# Patient Record
Sex: MALE | Race: WHITE | NOT HISPANIC OR LATINO | URBAN - METROPOLITAN AREA
[De-identification: names, ages, dates, MRNs, and addresses within clinical notes are randomized per-mention and may not be internally consistent; named-entity substitution may affect disease eponyms.]

---

## 2017-01-21 ENCOUNTER — OUTPATIENT (OUTPATIENT)
Dept: OUTPATIENT SERVICES | Facility: HOSPITAL | Age: 62
LOS: 1 days | End: 2017-01-21
Payer: MEDICAID

## 2017-01-21 PROCEDURE — 76536 US EXAM OF HEAD AND NECK: CPT | Mod: 26

## 2017-02-23 ENCOUNTER — OUTPATIENT (OUTPATIENT)
Dept: OUTPATIENT SERVICES | Facility: HOSPITAL | Age: 62
LOS: 1 days | End: 2017-02-23
Payer: MEDICAID

## 2017-02-24 ENCOUNTER — OUTPATIENT (OUTPATIENT)
Dept: OUTPATIENT SERVICES | Facility: HOSPITAL | Age: 62
LOS: 1 days | End: 2017-02-24

## 2017-02-24 PROCEDURE — 78014 THYROID IMAGING W/BLOOD FLOW: CPT | Mod: 26

## 2020-09-28 ENCOUNTER — APPOINTMENT (OUTPATIENT)
Dept: DISASTER EMERGENCY | Facility: CLINIC | Age: 65
End: 2020-09-28

## 2020-09-29 LAB — SARS-COV-2 N GENE NPH QL NAA+PROBE: NOT DETECTED

## 2020-10-01 ENCOUNTER — OUTPATIENT (OUTPATIENT)
Dept: INPATIENT UNIT | Facility: HOSPITAL | Age: 65
LOS: 1 days | End: 2020-10-01
Payer: MEDICARE

## 2020-10-01 PROCEDURE — 93010 ELECTROCARDIOGRAM REPORT: CPT

## 2021-03-31 ENCOUNTER — RESULT REVIEW (OUTPATIENT)
Age: 66
End: 2021-03-31

## 2021-08-02 ENCOUNTER — TRANSCRIPTION ENCOUNTER (OUTPATIENT)
Age: 66
End: 2021-08-02

## 2021-08-09 ENCOUNTER — LABORATORY RESULT (OUTPATIENT)
Age: 66
End: 2021-08-09

## 2021-08-09 ENCOUNTER — APPOINTMENT (OUTPATIENT)
Dept: FAMILY MEDICINE | Facility: CLINIC | Age: 66
End: 2021-08-09
Payer: MEDICARE

## 2021-08-09 VITALS
HEART RATE: 50 BPM | SYSTOLIC BLOOD PRESSURE: 138 MMHG | WEIGHT: 203.19 LBS | TEMPERATURE: 97.5 F | BODY MASS INDEX: 30.8 KG/M2 | OXYGEN SATURATION: 98 % | HEIGHT: 68 IN | RESPIRATION RATE: 16 BRPM | DIASTOLIC BLOOD PRESSURE: 64 MMHG

## 2021-08-09 DIAGNOSIS — Z80.3 FAMILY HISTORY OF MALIGNANT NEOPLASM OF BREAST: ICD-10-CM

## 2021-08-09 DIAGNOSIS — Z23 ENCOUNTER FOR IMMUNIZATION: ICD-10-CM

## 2021-08-09 DIAGNOSIS — Z87.891 PERSONAL HISTORY OF NICOTINE DEPENDENCE: ICD-10-CM

## 2021-08-09 DIAGNOSIS — Z00.00 ENCOUNTER FOR GENERAL ADULT MEDICAL EXAMINATION W/OUT ABNORMAL FINDINGS: ICD-10-CM

## 2021-08-09 DIAGNOSIS — Z84.1 FAMILY HISTORY OF DISORDERS OF KIDNEY AND URETER: ICD-10-CM

## 2021-08-09 DIAGNOSIS — Z82.49 FAMILY HISTORY OF ISCHEMIC HEART DISEASE AND OTHER DISEASES OF THE CIRCULATORY SYSTEM: ICD-10-CM

## 2021-08-09 DIAGNOSIS — Z78.9 OTHER SPECIFIED HEALTH STATUS: ICD-10-CM

## 2021-08-09 DIAGNOSIS — Z87.898 PERSONAL HISTORY OF OTHER SPECIFIED CONDITIONS: ICD-10-CM

## 2021-08-09 PROCEDURE — 99203 OFFICE O/P NEW LOW 30 MIN: CPT | Mod: 25

## 2021-08-09 PROCEDURE — 36415 COLL VENOUS BLD VENIPUNCTURE: CPT

## 2021-08-09 RX ORDER — METOPROLOL SUCCINATE 100 MG/1
100 TABLET, EXTENDED RELEASE ORAL
Qty: 180 | Refills: 0 | Status: ACTIVE | COMMUNITY
Start: 2021-04-21

## 2021-08-09 RX ORDER — TAMSULOSIN HYDROCHLORIDE 0.4 MG/1
0.4 CAPSULE ORAL
Qty: 30 | Refills: 0 | Status: ACTIVE | COMMUNITY
Start: 2021-06-30

## 2021-08-09 NOTE — PLAN
[FreeTextEntry1] : Blood work obtained.\antonino Will call with results of blood work.\antonino AVELAR will f/u with [facility]

## 2021-08-09 NOTE — HISTORY OF PRESENT ILLNESS
[FreeTextEntry8] : VALENTINO would like to establish care and have routine blood work done that he was getting done every 3 months with his previous primary care doctor \par [facility]\par allergies: PCN\par \par VALENTINO states he feels otherwise in good health.

## 2021-08-09 NOTE — HEALTH RISK ASSESSMENT
[Yes] : Yes [2 - 4 times a month (2 pts)] : 2-4 times a month (2 points) [1 or 2 (0 pts)] : 1 or 2 (0 points) [No] : In the past 12 months have you used drugs other than those required for medical reasons? No [Any fall with injury in past year] : Patient reported fall with injury in the past year [0] : 2) Feeling down, depressed, or hopeless: Not at all (0) [] : No [YearQuit] : 1996

## 2021-08-17 LAB
ALBUMIN SERPL ELPH-MCNC: 4.4 G/DL
ALP BLD-CCNC: 80 U/L
ALT SERPL-CCNC: 18 U/L
ANION GAP SERPL CALC-SCNC: 10 MMOL/L
AST SERPL-CCNC: 19 U/L
BASOPHILS # BLD AUTO: 0.04 K/UL
BASOPHILS NFR BLD AUTO: 0.5 %
BILIRUB SERPL-MCNC: 0.6 MG/DL
BUN SERPL-MCNC: 16 MG/DL
CALCIUM SERPL-MCNC: 10.1 MG/DL
CHLORIDE SERPL-SCNC: 102 MMOL/L
CHOLEST SERPL-MCNC: 104 MG/DL
CO2 SERPL-SCNC: 31 MMOL/L
CREAT SERPL-MCNC: 1.11 MG/DL
EOSINOPHIL # BLD AUTO: 0.17 K/UL
EOSINOPHIL NFR BLD AUTO: 2 %
ESTIMATED AVERAGE GLUCOSE: 123 MG/DL
ESTIMATED AVERAGE GLUCOSE: 126 MG/DL
GLUCOSE SERPL-MCNC: 117 MG/DL
HBA1C MFR BLD HPLC: 5.9 %
HBA1C MFR BLD HPLC: 6 %
HCT VFR BLD CALC: 44.5 %
HDLC SERPL-MCNC: 34 MG/DL
HGB BLD-MCNC: 14.3 G/DL
IMM GRANULOCYTES NFR BLD AUTO: 0.4 %
LDLC SERPL CALC-MCNC: 51 MG/DL
LYMPHOCYTES # BLD AUTO: 1.38 K/UL
LYMPHOCYTES NFR BLD AUTO: 16.4 %
MAN DIFF?: NORMAL
MCHC RBC-ENTMCNC: 29.9 PG
MCHC RBC-ENTMCNC: 32.1 GM/DL
MCV RBC AUTO: 93.1 FL
MONOCYTES # BLD AUTO: 0.52 K/UL
MONOCYTES NFR BLD AUTO: 6.2 %
NEUTROPHILS # BLD AUTO: 6.29 K/UL
NEUTROPHILS NFR BLD AUTO: 74.5 %
NONHDLC SERPL-MCNC: 70 MG/DL
PLATELET # BLD AUTO: 245 K/UL
POTASSIUM SERPL-SCNC: 5.1 MMOL/L
PROT SERPL-MCNC: 6.4 G/DL
RBC # BLD: 4.78 M/UL
RBC # FLD: 14 %
SODIUM SERPL-SCNC: 142 MMOL/L
TRIGL SERPL-MCNC: 98 MG/DL
TSH SERPL-ACNC: 7.9 UIU/ML
WBC # FLD AUTO: 8.43 K/UL

## 2021-09-23 ENCOUNTER — RX RENEWAL (OUTPATIENT)
Age: 66
End: 2021-09-23

## 2021-09-23 RX ORDER — BLOOD SUGAR DIAGNOSTIC
STRIP MISCELLANEOUS
Qty: 100 | Refills: 0 | Status: ACTIVE | COMMUNITY
Start: 2021-01-21 | End: 1900-01-01

## 2021-10-28 ENCOUNTER — RX RENEWAL (OUTPATIENT)
Age: 66
End: 2021-10-28

## 2021-10-28 RX ORDER — LANCETS 30 GAUGE
EACH MISCELLANEOUS
Qty: 1 | Refills: 2 | Status: ACTIVE | COMMUNITY
Start: 2021-10-28 | End: 1900-01-01

## 2021-11-01 ENCOUNTER — RX RENEWAL (OUTPATIENT)
Age: 66
End: 2021-11-01

## 2021-11-01 RX ORDER — LANCETS 28 GAUGE
EACH MISCELLANEOUS
Qty: 300 | Refills: 0 | Status: ACTIVE | COMMUNITY
Start: 2021-01-21 | End: 1900-01-01

## 2021-11-15 ENCOUNTER — RX RENEWAL (OUTPATIENT)
Age: 66
End: 2021-11-15

## 2021-12-16 ENCOUNTER — RX RENEWAL (OUTPATIENT)
Age: 66
End: 2021-12-16

## 2022-02-25 ENCOUNTER — LABORATORY RESULT (OUTPATIENT)
Age: 67
End: 2022-02-25

## 2022-02-25 ENCOUNTER — APPOINTMENT (OUTPATIENT)
Dept: FAMILY MEDICINE | Facility: CLINIC | Age: 67
End: 2022-02-25
Payer: MEDICARE

## 2022-02-25 VITALS
DIASTOLIC BLOOD PRESSURE: 62 MMHG | BODY MASS INDEX: 34.71 KG/M2 | WEIGHT: 229 LBS | TEMPERATURE: 98 F | HEART RATE: 49 BPM | SYSTOLIC BLOOD PRESSURE: 156 MMHG | OXYGEN SATURATION: 97 % | RESPIRATION RATE: 16 BRPM | HEIGHT: 68 IN

## 2022-02-25 PROCEDURE — G0009: CPT

## 2022-02-25 PROCEDURE — G0439: CPT

## 2022-02-25 PROCEDURE — 36415 COLL VENOUS BLD VENIPUNCTURE: CPT

## 2022-02-25 PROCEDURE — 90732 PPSV23 VACC 2 YRS+ SUBQ/IM: CPT

## 2022-02-25 RX ORDER — COLD-HOT PACK
125 MCG EACH MISCELLANEOUS
Refills: 0 | Status: ACTIVE | COMMUNITY
Start: 2022-02-25

## 2022-02-25 RX ORDER — ASCORBIC ACID 500 MG
500 TABLET ORAL
Refills: 0 | Status: ACTIVE | COMMUNITY
Start: 2022-02-25

## 2022-03-01 LAB
ALBUMIN SERPL ELPH-MCNC: 4.3 G/DL
ALP BLD-CCNC: 67 U/L
ALT SERPL-CCNC: 19 U/L
ANION GAP SERPL CALC-SCNC: 11 MMOL/L
AST SERPL-CCNC: 16 U/L
BASOPHILS # BLD AUTO: 0.04 K/UL
BASOPHILS NFR BLD AUTO: 0.5 %
BILIRUB SERPL-MCNC: 0.7 MG/DL
BUN SERPL-MCNC: 17 MG/DL
CALCIUM SERPL-MCNC: 9.3 MG/DL
CHLORIDE SERPL-SCNC: 100 MMOL/L
CHOLEST SERPL-MCNC: 113 MG/DL
CO2 SERPL-SCNC: 30 MMOL/L
CREAT SERPL-MCNC: 1.06 MG/DL
EOSINOPHIL # BLD AUTO: 0.23 K/UL
EOSINOPHIL NFR BLD AUTO: 2.9 %
ESTIMATED AVERAGE GLUCOSE: 126 MG/DL
GLUCOSE SERPL-MCNC: 124 MG/DL
HBA1C MFR BLD HPLC: 6 %
HCT VFR BLD CALC: 47.6 %
HDLC SERPL-MCNC: 34 MG/DL
HGB BLD-MCNC: 14.9 G/DL
IMM GRANULOCYTES NFR BLD AUTO: 0.3 %
LDLC SERPL CALC-MCNC: 55 MG/DL
LYMPHOCYTES # BLD AUTO: 1.26 K/UL
LYMPHOCYTES NFR BLD AUTO: 16.2 %
MAN DIFF?: NORMAL
MCHC RBC-ENTMCNC: 29.4 PG
MCHC RBC-ENTMCNC: 31.3 GM/DL
MCV RBC AUTO: 93.9 FL
MONOCYTES # BLD AUTO: 0.45 K/UL
MONOCYTES NFR BLD AUTO: 5.8 %
NEUTROPHILS # BLD AUTO: 5.8 K/UL
NEUTROPHILS NFR BLD AUTO: 74.3 %
NONHDLC SERPL-MCNC: 79 MG/DL
PLATELET # BLD AUTO: 192 K/UL
POTASSIUM SERPL-SCNC: 4.4 MMOL/L
PROT SERPL-MCNC: 6.4 G/DL
PSA FREE FLD-MCNC: 26 %
PSA FREE SERPL-MCNC: 1.72 NG/ML
PSA SERPL-MCNC: 6.72 NG/ML
RBC # BLD: 5.07 M/UL
RBC # FLD: 14.6 %
SODIUM SERPL-SCNC: 141 MMOL/L
TRIGL SERPL-MCNC: 123 MG/DL
TSH SERPL-ACNC: 8.13 UIU/ML
WBC # FLD AUTO: 7.8 K/UL

## 2022-03-01 NOTE — HISTORY OF PRESENT ILLNESS
[FreeTextEntry1] : FBW\par cpe\par "gets short of breath" on/off gained 20lbs\par VALENTINO did not take medications this am.  [de-identified] : VALENTINO sees cardio , GI and Urology.\par Colonoscopy [place] [facility] polop March 31, 2021\par Prostate - bx July 28, 2021\par VALENTINO f/u with optho and podo.

## 2022-03-01 NOTE — ADDENDUM
[FreeTextEntry1] : Reviewed blood work \par a1c 6.0\par Chol 117\par PSA 6.7- VALENTINO aware and will f/u with urology\par Sick euthyroid

## 2022-03-01 NOTE — PHYSICAL EXAM
[No Acute Distress] : no acute distress [Well Nourished] : well nourished [Well Developed] : well developed [Well-Appearing] : well-appearing [Normal Sclera/Conjunctiva] : normal sclera/conjunctiva [PERRL] : pupils equal round and reactive to light [EOMI] : extraocular movements intact [Normal Outer Ear/Nose] : the outer ears and nose were normal in appearance [No JVD] : no jugular venous distention [No Lymphadenopathy] : no lymphadenopathy [Supple] : supple [No Respiratory Distress] : no respiratory distress  [No Accessory Muscle Use] : no accessory muscle use [Clear to Auscultation] : lungs were clear to auscultation bilaterally [Normal Rate] : normal rate  [Regular Rhythm] : with a regular rhythm [Normal S1, S2] : normal S1 and S2 [No Murmur] : no murmur heard [Pedal Pulses Present] : the pedal pulses are present [Soft] : abdomen soft [Non Tender] : non-tender [Non-distended] : non-distended [Normal Posterior Cervical Nodes] : no posterior cervical lymphadenopathy [Normal Anterior Cervical Nodes] : no anterior cervical lymphadenopathy [No CVA Tenderness] : no CVA  tenderness [No Spinal Tenderness] : no spinal tenderness [No Joint Swelling] : no joint swelling [Grossly Normal Strength/Tone] : grossly normal strength/tone [No Rash] : no rash [Coordination Grossly Intact] : coordination grossly intact [No Focal Deficits] : no focal deficits [Normal Gait] : normal gait [Deep Tendon Reflexes (DTR)] : deep tendon reflexes were 2+ and symmetric [Normal Affect] : the affect was normal [Normal Insight/Judgement] : insight and judgment were intact [de-identified] : +2 edema b/l

## 2022-03-01 NOTE — PLAN
[FreeTextEntry1] : Blood work obtained.\par Will call with results of blood work.\par Pneumo 23 given\par Increased furosemide to 40 mg

## 2022-03-17 ENCOUNTER — RX RENEWAL (OUTPATIENT)
Age: 67
End: 2022-03-17

## 2022-05-10 ENCOUNTER — APPOINTMENT (OUTPATIENT)
Dept: FAMILY MEDICINE | Facility: CLINIC | Age: 67
End: 2022-05-10
Payer: MEDICARE

## 2022-05-10 ENCOUNTER — NON-APPOINTMENT (OUTPATIENT)
Age: 67
End: 2022-05-10

## 2022-05-10 VITALS
SYSTOLIC BLOOD PRESSURE: 130 MMHG | HEIGHT: 68 IN | BODY MASS INDEX: 35.31 KG/M2 | DIASTOLIC BLOOD PRESSURE: 80 MMHG | RESPIRATION RATE: 16 BRPM | HEART RATE: 67 BPM | WEIGHT: 233 LBS | TEMPERATURE: 98.2 F | OXYGEN SATURATION: 99 %

## 2022-05-10 VITALS — DIASTOLIC BLOOD PRESSURE: 86 MMHG | SYSTOLIC BLOOD PRESSURE: 140 MMHG

## 2022-05-10 DIAGNOSIS — Z86.79 PERSONAL HISTORY OF OTHER DISEASES OF THE CIRCULATORY SYSTEM: ICD-10-CM

## 2022-05-10 PROCEDURE — 99214 OFFICE O/P EST MOD 30 MIN: CPT | Mod: 25

## 2022-05-10 PROCEDURE — 36415 COLL VENOUS BLD VENIPUNCTURE: CPT

## 2022-05-10 NOTE — PHYSICAL EXAM
[No Edema] : there was no peripheral edema [No Extremity Clubbing/Cyanosis] : no extremity clubbing/cyanosis [Coordination Grossly Intact] : coordination grossly intact [No Focal Deficits] : no focal deficits [Normal Gait] : normal gait [Alert and Oriented x3] : oriented to person, place, and time [Normal] : affect was normal and insight and judgment were intact [de-identified] : truncal obesity

## 2022-05-10 NOTE — PLAN
[FreeTextEntry1] : Blood drawn in office\par 19A form completed\par low fat/ low carb diet recommended. exercise ( walking ) recommended as tolerated 30-40 minutes daily a minimum of 4 times per week.\par \par

## 2022-05-10 NOTE — HISTORY OF PRESENT ILLNESS
[FreeTextEntry1] : BP Check for 19 A  form\antonino Gilbert  [de-identified] : Carter needs BP documented on  19A form

## 2022-05-12 LAB
ALBUMIN SERPL ELPH-MCNC: 4.6 G/DL
ALP BLD-CCNC: 85 U/L
ALT SERPL-CCNC: 20 U/L
ANION GAP SERPL CALC-SCNC: 12 MMOL/L
AST SERPL-CCNC: 19 U/L
BILIRUB DIRECT SERPL-MCNC: 0.2 MG/DL
BILIRUB INDIRECT SERPL-MCNC: 0.4 MG/DL
BILIRUB SERPL-MCNC: 0.6 MG/DL
BUN SERPL-MCNC: 23 MG/DL
CALCIUM SERPL-MCNC: 9.9 MG/DL
CHLORIDE SERPL-SCNC: 97 MMOL/L
CHOLEST SERPL-MCNC: 122 MG/DL
CO2 SERPL-SCNC: 32 MMOL/L
CREAT SERPL-MCNC: 1.24 MG/DL
EGFR: 63 ML/MIN/1.73M2
ESTIMATED AVERAGE GLUCOSE: 131 MG/DL
GLUCOSE SERPL-MCNC: 104 MG/DL
HBA1C MFR BLD HPLC: 6.2 %
HDLC SERPL-MCNC: 38 MG/DL
LDLC SERPL CALC-MCNC: 52 MG/DL
NONHDLC SERPL-MCNC: 83 MG/DL
POTASSIUM SERPL-SCNC: 4.3 MMOL/L
PROT SERPL-MCNC: 6.8 G/DL
SODIUM SERPL-SCNC: 142 MMOL/L
T4 FREE SERPL-MCNC: 1.3 NG/DL
TRIGL SERPL-MCNC: 156 MG/DL
TSH SERPL-ACNC: 6.86 UIU/ML

## 2022-06-30 ENCOUNTER — RX RENEWAL (OUTPATIENT)
Age: 67
End: 2022-06-30

## 2022-07-01 ENCOUNTER — TRANSCRIPTION ENCOUNTER (OUTPATIENT)
Age: 67
End: 2022-07-01

## 2022-07-05 ENCOUNTER — RX RENEWAL (OUTPATIENT)
Age: 67
End: 2022-07-05

## 2022-09-27 ENCOUNTER — RX RENEWAL (OUTPATIENT)
Age: 67
End: 2022-09-27

## 2022-10-04 ENCOUNTER — RX RENEWAL (OUTPATIENT)
Age: 67
End: 2022-10-04

## 2022-11-11 ENCOUNTER — APPOINTMENT (OUTPATIENT)
Dept: FAMILY MEDICINE | Facility: CLINIC | Age: 67
End: 2022-11-11

## 2022-11-11 VITALS
WEIGHT: 218 LBS | TEMPERATURE: 98.2 F | DIASTOLIC BLOOD PRESSURE: 80 MMHG | HEART RATE: 80 BPM | HEIGHT: 68 IN | RESPIRATION RATE: 16 BRPM | OXYGEN SATURATION: 97 % | BODY MASS INDEX: 33.04 KG/M2 | SYSTOLIC BLOOD PRESSURE: 110 MMHG

## 2022-11-11 PROCEDURE — 99214 OFFICE O/P EST MOD 30 MIN: CPT

## 2022-11-11 RX ORDER — AMLODIPINE BESYLATE 5 MG/1
5 TABLET ORAL DAILY
Qty: 90 | Refills: 0 | Status: DISCONTINUED | COMMUNITY
Start: 2021-02-22 | End: 2022-11-11

## 2022-11-11 RX ORDER — TORSEMIDE 20 MG/1
20 TABLET ORAL
Qty: 30 | Refills: 0 | Status: ACTIVE | COMMUNITY
Start: 2022-08-16

## 2022-11-11 RX ORDER — ZOSTER VACCINE RECOMBINANT, ADJUVANTED 50 MCG/0.5
50 KIT INTRAMUSCULAR
Qty: 1 | Refills: 0 | Status: DISCONTINUED | COMMUNITY
Start: 2022-02-25 | End: 2022-11-11

## 2022-11-11 RX ORDER — APIXABAN 5 MG/1
5 TABLET, FILM COATED ORAL
Qty: 180 | Refills: 0 | Status: ACTIVE | COMMUNITY
Start: 2022-05-24

## 2022-11-11 RX ORDER — DUTASTERIDE 0.5 MG/1
0.5 CAPSULE, LIQUID FILLED ORAL
Qty: 90 | Refills: 0 | Status: ACTIVE | COMMUNITY
Start: 2022-07-12

## 2022-11-11 RX ORDER — MUPIROCIN 20 MG/G
2 OINTMENT TOPICAL
Qty: 22 | Refills: 0 | Status: DISCONTINUED | COMMUNITY
Start: 2022-08-23 | End: 2022-11-11

## 2022-11-11 NOTE — PHYSICAL EXAM
[No Acute Distress] : no acute distress [Well Nourished] : well nourished [Well Developed] : well developed [Well-Appearing] : well-appearing [Normal Sclera/Conjunctiva] : normal sclera/conjunctiva [PERRL] : pupils equal round and reactive to light [EOMI] : extraocular movements intact [Normal Outer Ear/Nose] : the outer ears and nose were normal in appearance [No JVD] : no jugular venous distention [No Lymphadenopathy] : no lymphadenopathy [Supple] : supple [No Respiratory Distress] : no respiratory distress  [No Accessory Muscle Use] : no accessory muscle use [Clear to Auscultation] : lungs were clear to auscultation bilaterally [Normal Rate] : normal rate  [Regular Rhythm] : with a regular rhythm [Normal S1, S2] : normal S1 and S2 [No Murmur] : no murmur heard [Pedal Pulses Present] : the pedal pulses are present [Soft] : abdomen soft [Non Tender] : non-tender [Non-distended] : non-distended [Normal Posterior Cervical Nodes] : no posterior cervical lymphadenopathy [Normal Anterior Cervical Nodes] : no anterior cervical lymphadenopathy [No CVA Tenderness] : no CVA  tenderness [No Spinal Tenderness] : no spinal tenderness [No Joint Swelling] : no joint swelling [Grossly Normal Strength/Tone] : grossly normal strength/tone [No Rash] : no rash [Coordination Grossly Intact] : coordination grossly intact [No Focal Deficits] : no focal deficits [Normal Gait] : normal gait [Deep Tendon Reflexes (DTR)] : deep tendon reflexes were 2+ and symmetric [Normal Affect] : the affect was normal [Normal Insight/Judgement] : insight and judgment were intact [de-identified] : +2 edema b/l

## 2022-11-11 NOTE — HISTORY OF PRESENT ILLNESS
[FreeTextEntry1] : paperwork filled out for [employer] \par VALENTINO states he had ablation done 9/28/2022 states A-Fib is back, has upcoming apt with cardio to see what is going on \par would like to discuss being prescribed semaglutide as per surgeon \par losartan refilled \par Kiki [place]  [de-identified] : VALENTINO states he feels otherwise in good health. \par Ablation 9/25/2022

## 2022-11-11 NOTE — HEALTH RISK ASSESSMENT
[Former] : Former [No] : In the past 12 months have you used drugs other than those required for medical reasons? No [Any fall with injury in past year] : Patient reported fall with injury in the past year [0] : 2) Feeling down, depressed, or hopeless: Not at all (0) [PHQ-2 Negative - No further assessment needed] : PHQ-2 Negative - No further assessment needed [Audit-CScore] : 0 [[id number]] : 0

## 2022-12-27 ENCOUNTER — RX RENEWAL (OUTPATIENT)
Age: 67
End: 2022-12-27

## 2022-12-30 ENCOUNTER — RX RENEWAL (OUTPATIENT)
Age: 67
End: 2022-12-30

## 2023-02-21 ENCOUNTER — TRANSCRIPTION ENCOUNTER (OUTPATIENT)
Age: 68
End: 2023-02-21

## 2023-02-21 RX ORDER — LOSARTAN POTASSIUM AND HYDROCHLOROTHIAZIDE 25; 100 MG/1; MG/1
100-25 TABLET ORAL
Qty: 90 | Refills: 0 | Status: ACTIVE | COMMUNITY
Start: 2021-02-22 | End: 1900-01-01

## 2023-03-12 ENCOUNTER — APPOINTMENT (OUTPATIENT)
Dept: FAMILY MEDICINE | Facility: CLINIC | Age: 68
End: 2023-03-12
Payer: MEDICARE

## 2023-03-12 VITALS
TEMPERATURE: 98 F | DIASTOLIC BLOOD PRESSURE: 70 MMHG | RESPIRATION RATE: 16 BRPM | OXYGEN SATURATION: 96 % | WEIGHT: 222.9 LBS | HEART RATE: 86 BPM | BODY MASS INDEX: 33.78 KG/M2 | HEIGHT: 68 IN | SYSTOLIC BLOOD PRESSURE: 110 MMHG

## 2023-03-12 DIAGNOSIS — E03.9 HYPOTHYROIDISM, UNSPECIFIED: ICD-10-CM

## 2023-03-12 DIAGNOSIS — I10 ESSENTIAL (PRIMARY) HYPERTENSION: ICD-10-CM

## 2023-03-12 DIAGNOSIS — E11.9 TYPE 2 DIABETES MELLITUS W/OUT COMPLICATIONS: ICD-10-CM

## 2023-03-12 DIAGNOSIS — I48.0 PAROXYSMAL ATRIAL FIBRILLATION: ICD-10-CM

## 2023-03-12 PROCEDURE — 99214 OFFICE O/P EST MOD 30 MIN: CPT | Mod: 25

## 2023-03-12 PROCEDURE — G0444 DEPRESSION SCREEN ANNUAL: CPT | Mod: 59

## 2023-03-12 RX ORDER — MOLNUPIRAVIR 200 MG/1
200 CAPSULE ORAL
Qty: 40 | Refills: 0 | Status: DISCONTINUED | COMMUNITY
Start: 2023-02-12 | End: 2023-03-12

## 2023-03-12 RX ORDER — TRIAMCINOLONE ACETONIDE 1 MG/G
0.1 OINTMENT TOPICAL
Qty: 80 | Refills: 0 | Status: DISCONTINUED | COMMUNITY
Start: 2022-08-23 | End: 2023-03-12

## 2023-03-12 RX ORDER — HYDROCODONE BITARTRATE AND ACETAMINOPHEN 5; 325 MG/1; MG/1
5-325 TABLET ORAL
Qty: 10 | Refills: 0 | Status: DISCONTINUED | COMMUNITY
Start: 2021-07-08 | End: 2023-03-12

## 2023-03-12 RX ORDER — PANTOPRAZOLE 40 MG/1
40 TABLET, DELAYED RELEASE ORAL
Qty: 30 | Refills: 0 | Status: DISCONTINUED | COMMUNITY
Start: 2022-09-28 | End: 2023-03-12

## 2023-03-12 RX ORDER — CALCIPOTRIENE 50 UG/G
0.01 OINTMENT TOPICAL
Qty: 120 | Refills: 0 | Status: DISCONTINUED | COMMUNITY
Start: 2022-08-23 | End: 2023-03-12

## 2023-03-12 RX ORDER — FUROSEMIDE 40 MG/1
40 TABLET ORAL DAILY
Qty: 90 | Refills: 0 | Status: DISCONTINUED | COMMUNITY
Start: 2021-02-09 | End: 2023-03-12

## 2023-03-12 RX ORDER — TERBINAFINE HYDROCHLORIDE 250 MG/1
250 TABLET ORAL
Refills: 0 | Status: DISCONTINUED | COMMUNITY
Start: 2022-02-25 | End: 2023-03-12

## 2023-03-12 RX ORDER — COVID-19 ANTIGEN TEST
KIT MISCELLANEOUS
Qty: 8 | Refills: 0 | Status: DISCONTINUED | COMMUNITY
Start: 2023-03-06

## 2023-03-12 NOTE — PHYSICAL EXAM
[No Acute Distress] : no acute distress [Well Nourished] : well nourished [Well Developed] : well developed [Well-Appearing] : well-appearing [Normal Sclera/Conjunctiva] : normal sclera/conjunctiva [PERRL] : pupils equal round and reactive to light [EOMI] : extraocular movements intact [Normal Outer Ear/Nose] : the outer ears and nose were normal in appearance [No JVD] : no jugular venous distention [No Lymphadenopathy] : no lymphadenopathy [Supple] : supple [No Respiratory Distress] : no respiratory distress  [No Accessory Muscle Use] : no accessory muscle use [Clear to Auscultation] : lungs were clear to auscultation bilaterally [Normal Rate] : normal rate  [Regular Rhythm] : with a regular rhythm [Normal S1, S2] : normal S1 and S2 [No Murmur] : no murmur heard [Pedal Pulses Present] : the pedal pulses are present [Soft] : abdomen soft [Non Tender] : non-tender [Non-distended] : non-distended [No Spinal Tenderness] : no spinal tenderness [No CVA Tenderness] : no CVA  tenderness [No Joint Swelling] : no joint swelling [Grossly Normal Strength/Tone] : grossly normal strength/tone [No Rash] : no rash [No Focal Deficits] : no focal deficits [Coordination Grossly Intact] : coordination grossly intact [Normal Gait] : normal gait [Deep Tendon Reflexes (DTR)] : deep tendon reflexes were 2+ and symmetric [Normal Affect] : the affect was normal [Alert and Oriented x3] : oriented to person, place, and time [Normal Mood] : the mood was normal [Normal Insight/Judgement] : insight and judgment were intact [de-identified] : +2 edema b/l

## 2023-03-12 NOTE — HISTORY OF PRESENT ILLNESS
[FreeTextEntry1] : CC: Medication refill\par Has been following up with cardiology for AF. He has had cardio ablation. Dr. Stevens. He is going to f/u with cardioversion April 7th. [facility].  [de-identified] : Recent a1c 5.6

## 2023-06-22 ENCOUNTER — RX RENEWAL (OUTPATIENT)
Age: 68
End: 2023-06-22

## 2023-06-22 RX ORDER — GLIMEPIRIDE 2 MG/1
2 TABLET ORAL
Qty: 90 | Refills: 0 | Status: ACTIVE | COMMUNITY
Start: 2021-04-22 | End: 1900-01-01

## 2023-06-22 RX ORDER — METFORMIN ER 500 MG 500 MG/1
500 TABLET ORAL
Qty: 180 | Refills: 0 | Status: ACTIVE | COMMUNITY
Start: 2021-01-21 | End: 1900-01-01

## 2023-07-05 ENCOUNTER — APPOINTMENT (OUTPATIENT)
Dept: NEUROLOGY | Facility: CLINIC | Age: 68
End: 2023-07-05
Payer: MEDICARE

## 2023-07-05 VITALS
HEART RATE: 70 BPM | OXYGEN SATURATION: 96 % | DIASTOLIC BLOOD PRESSURE: 82 MMHG | BODY MASS INDEX: 34.53 KG/M2 | SYSTOLIC BLOOD PRESSURE: 120 MMHG | WEIGHT: 220 LBS | HEIGHT: 67 IN

## 2023-07-05 PROCEDURE — 99204 OFFICE O/P NEW MOD 45 MIN: CPT

## 2023-07-05 RX ORDER — ASPIRIN 81 MG
81 TABLET, DELAYED RELEASE (ENTERIC COATED) ORAL
Refills: 0 | Status: COMPLETED | COMMUNITY
End: 2023-07-05

## 2023-07-05 NOTE — ASSESSMENT
[FreeTextEntry1] : 68 YO M w/frequent falls - likely multifactorial - diabetic peripheral neuropathy, knee arthritis, and possible lumbo/sacral stenosis, BL pitting edema, and ?orthostasis\par - orthostatic vital \par - MRI brain pending per primary, will request MRI L/Sspine without contrast\par - tight glycemic control\par - cardiology follow up for Afib/flutter - continue AC\par - Ortho follow up for knee pain/cracking\par - EMG/NCV BLLEs - ?peripheral neuropathy \par follow up after above tests are completed.

## 2023-07-05 NOTE — PHYSICAL EXAM
[FreeTextEntry1] : awake, alert, oriented x3\par speech is fluent, language appropriate\par JEFF, EOMI, Face symmetric, facial sensation intact, tongue midline\par BLUEs strength is 5/5\par RLE hip flexor/extensor 3+/5, knee flexor/extensors 3+/5, dorsi/plantar flexion 4/5\par LLE 4/5 proximally and distally\par sensation decrease to LT below both knees\par Romberg +\par Gait: wide based\par FTN intact BL

## 2023-07-05 NOTE — HISTORY OF PRESENT ILLNESS
[FreeTextEntry1] : 66 YO M was referred for frequent falls. Last fall was last night, pt reports swaying always to the right. Pt reports weakness in his right leg for the past year. He underwent right knee surgery and supposed to have it reevaluated in a few weeks. Pt reports bilateral leg swelling for many months, and uses diabetic socks. He has had bilateral feet numbness for the past few years.He has Afib s/p oblation and cardioversion, which did not work. He is on Apixaban 5mg BID.\par Denies any other weakness, numbness, visual changes, speech or language abnormalities.

## 2023-08-08 ENCOUNTER — APPOINTMENT (OUTPATIENT)
Dept: ORTHOPEDIC SURGERY | Facility: CLINIC | Age: 68
End: 2023-08-08
Payer: MEDICARE

## 2023-08-08 VITALS — BODY MASS INDEX: 34.53 KG/M2 | WEIGHT: 220 LBS | HEIGHT: 67 IN

## 2023-08-08 DIAGNOSIS — M17.11 UNILATERAL PRIMARY OSTEOARTHRITIS, RIGHT KNEE: ICD-10-CM

## 2023-08-08 PROCEDURE — 73564 X-RAY EXAM KNEE 4 OR MORE: CPT | Mod: RT

## 2023-08-08 PROCEDURE — 99204 OFFICE O/P NEW MOD 45 MIN: CPT

## 2023-08-08 NOTE — DISCUSSION/SUMMARY
[de-identified] :  Lengthy discussion regarding options was had with the patient. Nonsurgical options including but not limited to cortisone, viscosupplementation, anti-inflammatory medications, activity modification, non-impact exercise, maintaining a healthy BMI, bracing, and icing were reviewed. Surgical options including but not limited to arthroscopy, and joint replacement were discussed as was risks, benefits and alternatives. All questions were answered.  I spent time going in detail the problem and the associated risks/benefits of surgery. detailed complications but not limited to: of nerve injury, non union, repeat fx, dvt/pe postop, instability,transfusion, infection, NVA injury, stiffness, leg length discrepancy, inability to ambulate & death. discussed implant and model shown to patient. answered all patient questions. patient understands procedure and postop directions. Patient has failed conservative treatment and  PT is contraindicated at this time   Plan is for RIGHT TOTAL KNEE ARTHROPLASTY

## 2023-08-08 NOTE — HISTORY OF PRESENT ILLNESS
[de-identified] : Date of Injury/Onset:      6 mo Pain: At Rest: 2 /10    With Activity: 8 /10  Affecting Sleep:N Difficulty with stairs:Y Difficulty getting in and out of car:Y Sit to stand stiffness:Y Mechanism of injury:  NKI This  is not a Work Related Injury being treated under Worker's Compensation. This is not   an athletic injury occurring associated with an interscholastic or organized sports team. Quality of symptoms: C/O ANTERIOR KNEE PAIN, GIVING WAY/'INSTABILITY, C/O WEAKNESS Improves with:    REST Worse with:    WALKING/STAIRS Previous Treatment/Imaging/Studies Since Last Visit: BRACES, TYLENOL FOR PAIN NO NSAIDS D/T ELIQUIS USE. HAD SCOPE 10 YEARS AGO.  Reports Available For Review Today: NONE

## 2023-08-08 NOTE — PHYSICAL EXAM
[5___] : hamstring 5[unfilled]/5 [Right] : right knee [AP] : anteroposterior [Lateral] : lateral [Ruskin] : skyline [AP Standing] : anteroposterior standing [] : no deformities of patellar tendon [FreeTextEntry3] : Venous STASIS CHANGES Bilaterally Lower EXTREMITIES  [FreeTextEntry9] : joint space narrowing. Sclerotic Bone Valgus Knee bone on bone LATERAL. tri comp Osteophyte formation. No fractures seen  [TWNoteComboBox7] : flexion 110 degrees [de-identified] : extension 5 degrees

## 2023-08-16 ENCOUNTER — APPOINTMENT (OUTPATIENT)
Dept: NEUROLOGY | Facility: CLINIC | Age: 68
End: 2023-08-16
Payer: MEDICARE

## 2023-08-16 PROCEDURE — 99214 OFFICE O/P EST MOD 30 MIN: CPT

## 2023-08-16 NOTE — ASSESSMENT
[FreeTextEntry1] : 66 y/o male with h/o frequent falls, dizziness, gait instability, here for follow up visit and discuss recent imaging studies. Recent spine imaging demonstrates multiple disc herniations with mild foraminal stenosis. Patient is neurologically stable at this time, no new focal deficit observed on exam. - MRA head/neck to evaluate intracranial/extracranial vasculature - intermittent dizziness - EMG/NCS of BLE to evaluate for nerve/muscle abnormalities - continue wearing compression stockings  - lifestyle modifications discussed, diet, exercise as tolerated, weight loss - recommended pt to f/u with PCP/general surgery for abdominal hernia   Further management to be decided pending diagnostic study results. Patient to return to office in 2 months, or sooner if needed. Patient understands to seek urgent medical evaluation if any symptoms occur or worsen.

## 2023-08-16 NOTE — HISTORY OF PRESENT ILLNESS
[FreeTextEntry1] : 66 y/o male w/ frequent falls, dizziness, gait instability. Last office visit on 7/05/23 - no new complaints since then. Presents to office today to discuss recent imaging results. MRI Brain unremarkable. No recent vessel imaging. Discussed recent spine imaging, notable for multiple herniated discs. Patient states R leg has been weak, plan is for right total knee arthroplasty with ortho Dr. Kalpesh Mast. Has been wearing thigh high compression stockings, wearing for 2 weeks, feeling better with them.  Currently working as a . No plans of residential soon.

## 2023-08-16 NOTE — REVIEW OF SYSTEMS
[As Noted in HPI] : as noted in HPI [FreeTextEntry2] : 10 systems were reviewed and are negative except whats in HPI [de-identified] : 10 systems reviewed, as documented in HPI or negative.

## 2023-08-16 NOTE — PHYSICAL EXAM
[FreeTextEntry1] : PHYSICAL EXAM:   NEURO: Mental Status Oriented to person, place, time, and situation Speech is clear, fluent, and spontaneous.    Cranial Nerves II: visual fields full to confrontation III, IV, VI: pupils equal, round, reactive to light. Extraocular movements intact. V: facial sensation intact and symmetric in V1, V2, V3 VII: facial movement intact and symmetric VIII: hearing intact IX, X: uvula midline, soft palate elevates normally XI: bilateral shoulder shrug intact XII: tongue midline, no tongue bite sign or deviation on protrusion   Motor Tone and bulk intact 5/5 strength of bilateral upper and lower extremities No pronator drift   Sensation Light touch grossly intact No extinction    Coordination Normal finger to nose bilaterally No past pointing   Gait Normal stance, stride, and pivot turn

## 2023-10-13 DIAGNOSIS — R22.1 LOCALIZED SWELLING, MASS AND LUMP, NECK: ICD-10-CM

## 2023-10-19 PROBLEM — R22.1 NECK MASS: Status: ACTIVE | Noted: 2023-10-19

## 2023-10-31 ENCOUNTER — APPOINTMENT (OUTPATIENT)
Dept: NEUROLOGY | Facility: CLINIC | Age: 68
End: 2023-10-31
Payer: MEDICARE

## 2023-10-31 VITALS
SYSTOLIC BLOOD PRESSURE: 122 MMHG | HEART RATE: 75 BPM | HEIGHT: 67 IN | WEIGHT: 218 LBS | DIASTOLIC BLOOD PRESSURE: 80 MMHG | OXYGEN SATURATION: 98 % | BODY MASS INDEX: 34.21 KG/M2

## 2023-10-31 PROCEDURE — 99215 OFFICE O/P EST HI 40 MIN: CPT

## 2023-12-13 ENCOUNTER — NON-APPOINTMENT (OUTPATIENT)
Age: 68
End: 2023-12-13

## 2023-12-14 DIAGNOSIS — R79.9 ABNORMAL FINDING OF BLOOD CHEMISTRY, UNSPECIFIED: ICD-10-CM

## 2024-01-02 ENCOUNTER — APPOINTMENT (OUTPATIENT)
Dept: NEUROLOGY | Facility: CLINIC | Age: 69
End: 2024-01-02
Payer: MEDICARE

## 2024-01-02 VITALS
HEART RATE: 75 BPM | DIASTOLIC BLOOD PRESSURE: 80 MMHG | BODY MASS INDEX: 34.21 KG/M2 | SYSTOLIC BLOOD PRESSURE: 130 MMHG | WEIGHT: 218 LBS | HEIGHT: 67 IN | OXYGEN SATURATION: 97 %

## 2024-01-02 DIAGNOSIS — R42 DIZZINESS AND GIDDINESS: ICD-10-CM

## 2024-01-02 DIAGNOSIS — R29.898 OTHER SYMPTOMS AND SIGNS INVOLVING THE MUSCULOSKELETAL SYSTEM: ICD-10-CM

## 2024-01-02 PROCEDURE — 99215 OFFICE O/P EST HI 40 MIN: CPT

## 2024-01-02 RX ORDER — LEVOTHYROXINE SODIUM 0.05 MG/1
50 TABLET ORAL
Refills: 0 | Status: ACTIVE | COMMUNITY

## 2024-01-02 RX ORDER — AMIODARONE HYDROCHLORIDE 200 MG/1
200 TABLET ORAL
Qty: 90 | Refills: 0 | Status: DISCONTINUED | COMMUNITY
Start: 2021-05-07 | End: 2024-01-02

## 2024-01-02 NOTE — REVIEW OF SYSTEMS
[As Noted in HPI] : as noted in HPI [Leg Weakness] : leg weakness [Numbness] : numbness [Tingling] : tingling [Abnormal Sensation] : an abnormal sensation [Dizziness] : dizziness [Difficulty Walking] : difficulty walking [Confused or Disoriented] : no confusion [Memory Lapses or Loss] : no memory loss [Facial Weakness] : no facial weakness [Arm Weakness] : no arm weakness [Hand Weakness] : no hand weakness [Cluster Headache] : no cluster headache [Migraine Headache] : no migraine headache [Tension Headache] : no tension-type headache [Inability to Walk] : able to walk [Frequent Falls] : not falling [Eyesight Problems] : no eyesight problems [Loss Of Hearing] : no hearing loss [Vomiting] : no vomiting [Incontinence] : no incontinence [FreeTextEntry2] :  10 systems reviewed, as documented in HPI or negative.

## 2024-01-02 NOTE — HISTORY OF PRESENT ILLNESS
[FreeTextEntry1] : Carter Israel is a 68 year old male with medical history significant for T2DM, HTN, pAF on Eliquis, presenting for follow up. He was previously seen by Dr. Felix on 8/16/23 for dizziness, gait instability, and frequent falls.  He presents today to discuss recent blood work results from 11/2023.  Results were notable for positive JORDAN, Sm as well as high kappa lambda ratio and free kappa light chains.  Referrals for rheumatology and hematology, patient has yet to make appointments and see the specialist.  His TSH was also high, so he saw his PCP for this, started on levothyroxine and will have thyroid levels rechecked in 2 months.  He previously trialed the gabapentin for his neuropathy, but had intolerable side effects including dizziness and lightheadedness in the first couple of days at 100 mg TID, so he self discontinued this.  He has not gone to physical therapy yet.  He remains with the same symptoms, dizziness, unsteady gait, numbness and tingling of bilateral lower extremities.  He still driving as a , his symptoms do not interfere with his job, no MVAs.  No recent falls.  He plans to get his right knee replaced sometime this summer.  He recently obtained CAT scan of the neck to evaluate possible mass in the neck strap musculature, however the report is not back yet.  His blood pressure and sugars have been stable at home.  ----------------------- HPI on 10/31/23: "Today, he remains with dizziness and unsteady gait but states these have been better since he started wearing compression stockings. He was told by his wife that he is not as off balance compared to before. He has not had any falls since his last office visit. His BLE paresthesia remains the same, described as numbness and tingling. He denies any burning pain or nighttime disturbances. He denies new headache, vision changes, double vision, blurry vision, difficulty with speech or swallowing, new focal weakness, BUE numbness and tingling. Per pt his BP and sugars have been stable at home. EMG/NCS of BLE was done on 8/25/23, demonstrating sensorimotor peripheral neuropathy. MRA head/neck 10/14/23 shows possible mass in neck strap musculature, but without LVO or hemodynamically significant stenoses."  HPI on 8/16/23: "Presents to office today to discuss recent imaging results. MRI Brain unremarkable. No recent vessel imaging. Discussed recent spine imaging, notable for multiple herniated discs. Patient states R leg has been weak, plan is for right total knee arthroplasty with ortho Dr. Kalpesh Mast. Has been wearing thigh high compression stockings, wearing for 2 weeks, feeling better with them. Currently working as a . No plans of residential soon."  Initial HPI on 7/05/23: "66 YO M was referred for frequent falls. Last fall was last night, pt reports swaying always to the right. Pt reports weakness in his right leg for the past year. He underwent right knee surgery and supposed to have it reevaluated in a few weeks. Pt reports bilateral leg swelling for many months, and uses diabetic socks. He has had bilateral feet numbness for the past few years.He has Afib s/p oblation and cardioversion, which did not work. He is on Apixaban 5mg BID. Denies any other weakness, numbness, visual changes, speech or language abnormalities."

## 2024-01-02 NOTE — ASSESSMENT
[FreeTextEntry1] : - 69 y/o M with PMHx T2DM, HTN, pAF on Eliquis here for f/u re: dizziness, unsteady gait, RLE weakness, discuss recent diagnostic work up Blood work up with +JORDAN, Sm. High Kappa lambda ratio, high free kappa light chains.  EMG/NCS shows BLE sensorimotor peripheral neuropathy MRA H/N negative for LVO or significant stenoses. Possible mass in anterior strap musculature. MRI L-spine multilevel central canal and neuroforaminal stenosis and nerve root impingements Physical exam notable for decreased strength of RLE. Decreased vibration ankle b/l. Intact to light touch throughout.  Recommendations: - pending CT neck soft tissue w/ w/o to evaluate possible mass in anterior strap musculature - pending rheum evaluation for +JORDAN, Sm - pending heme evaluation for high kappa lambda ratio, high kappa light chains - pending physical therapy eval, referral placed for BLE weakness, gait and stability stroke risk factor modifications discussed in detail: - BP control - heart rate and rhythm control - glucose and cholesterol control - healthy lifestyle, diet, physical activity as tolerated - close f/u with care providers for tx of comorbidities  Patient to return to office in 3 months, or sooner if needed. Patient understands to seek urgent medical evaluation for any new or worsening symptoms.

## 2024-01-02 NOTE — PHYSICAL EXAM
[FreeTextEntry1] : NEURO: Mental Status Oriented to person, place, time, and situation Speech is clear, fluent, and spontaneous. Comprehension and memory intact   Cranial Nerves Visual fields full to confrontation Pupils equal, round, reactive to light. Extraocular movements intact. No nystagmus or ptosis. Facial sensation intact and symmetric in V1, V2, V3 Facial movement intact and symmetric Uvula midline, soft palate elevates normally Bilateral shoulder shrug intact Tongue midline, no tongue bite sign or deviation on protrusion   Motor Tone and bulk intact Shoulder abduction: 5/5 b/l Elbow flexion/extension: 5/5 bl Hand : 5/5 b/l Hip flexion/extension: 4-/5 R 5/5 L Knee flexion/extension: 4/5 R 5/5 L Dorsiflexion/plantar flexion: 4/5 R 5/5 L No pronator drift   Sensation Light touch grossly intact Vibration diminished b/l ankles, intact b/l knee   Deep Tendon Reflexes Biceps 2+ b/l Brachioradialis 2+ b/l Patellar 1+ b/l Ankle 1+ b/l   Coordination Normal finger to nose bilaterally No past pointing No tremor appreciated.   Gait Normal stance, stride, and pivot turn Unable to tandem walk Negative Romberg.     GEN: awake, alert, interactive, no acute distress EYES: sclera white, conjunctiva clear, no redness or discharge ENT: normal appearing outer ears, hearing grossly intact PULM: normal respiratory rhythm and effort EXT: moving all extremities spontaneously, no edema, no cyanosis SKIN: warm, dry, no lesions on visualized skin

## 2024-02-08 DIAGNOSIS — R53.1 WEAKNESS: ICD-10-CM

## 2024-03-14 ENCOUNTER — APPOINTMENT (OUTPATIENT)
Dept: ORTHOPEDIC SURGERY | Facility: CLINIC | Age: 69
End: 2024-03-14
Payer: MEDICARE

## 2024-03-14 VITALS — WEIGHT: 214 LBS | BODY MASS INDEX: 33.59 KG/M2 | HEIGHT: 67 IN

## 2024-03-14 DIAGNOSIS — R76.8 OTHER SPECIFIED ABNORMAL IMMUNOLOGICAL FINDINGS IN SERUM: ICD-10-CM

## 2024-03-14 PROCEDURE — 99214 OFFICE O/P EST MOD 30 MIN: CPT

## 2024-03-14 NOTE — REASON FOR VISIT
[FreeTextEntry2] : here for f/u right knee.  here to discuss right TKA surgery.  states he is ready to proceed with surgery.  c/o pain is same as last visit in 8/2023 has been going to chiropractor for help with pain. using tylenol for pain,

## 2024-03-14 NOTE — DISCUSSION/SUMMARY
[de-identified] : Lengthy discussion regarding options was had with the patient. Nonsurgical options including but not limited to cortisone,  ,  - medications (both steroidal and non-steroidal), activity modification, non-impact exercise, maintaining a healthy BMI, bracing, and icing were reviewed. Surgical options including but not limited to arthroscopy, and joint replacement were discussed as was risks, benefits and alternatives.   Disccussed  arthrofibrosisand   maintaining flexion and achieving extension. Continue stretching and HEP  prior to sx.

## 2024-03-14 NOTE — PHYSICAL EXAM
[5___] : hamstring 5[unfilled]/5 [] : light touch is intact throughout [de-identified] : Patella tracking laterally. [TWNoteComboBox7] : flexion 110 degrees [de-identified] : extension 5 degrees

## 2024-04-02 ENCOUNTER — APPOINTMENT (OUTPATIENT)
Dept: NEUROLOGY | Facility: CLINIC | Age: 69
End: 2024-04-02
Payer: MEDICARE

## 2024-04-02 VITALS — SYSTOLIC BLOOD PRESSURE: 130 MMHG | DIASTOLIC BLOOD PRESSURE: 80 MMHG

## 2024-04-02 VITALS
HEART RATE: 82 BPM | DIASTOLIC BLOOD PRESSURE: 78 MMHG | BODY MASS INDEX: 32.65 KG/M2 | WEIGHT: 208 LBS | HEIGHT: 67 IN | SYSTOLIC BLOOD PRESSURE: 156 MMHG | OXYGEN SATURATION: 96 %

## 2024-04-02 PROCEDURE — G2211 COMPLEX E/M VISIT ADD ON: CPT

## 2024-04-02 PROCEDURE — 99215 OFFICE O/P EST HI 40 MIN: CPT

## 2024-04-02 RX ORDER — GABAPENTIN 100 MG/1
100 CAPSULE ORAL
Qty: 90 | Refills: 0 | Status: DISCONTINUED | COMMUNITY
Start: 2023-10-31 | End: 2024-04-02

## 2024-04-02 NOTE — HISTORY OF PRESENT ILLNESS
[FreeTextEntry1] : Carter Israel is a 68 year old male with medical history significant for T2DM, HTN, pAF on Eliquis, presenting for follow up. He was previously seen by Dr. Felix on 8/16/23 for dizziness, gait instability, and frequent falls.  Since his last visit, he has seen hematology, their workup ruled out lymphoma and leukemia.  They referred him to nephrology because they believe there is possibly decreased renal clearance of light chains.  He is following up with them in 6 months.  He was also seen by rheumatology for an initial visit, he is seeing them again in a few months for further blood work.  He has been going to physical therapy for the past 1.5 months, he said that the exercises helped.  He has been doing them at home.  He does states that the treatments does not last long.  He has been going 3 times per week.  They recommended that he started an OTC B complex vitamin, he noticed a big difference in his energy level.  He fell at home 2 months ago, no head strike.  States that his left side still feels sore, but the swelling has resolved.  He started on thyroid medication with his PCP, had blood work recently which showed that the TSH normalized.  The mass on his neck was evaluated via CT neck, was told by his PCP that it looks normal.  He is following with orthopedic Dr. Mast, scheduled for a right knee replacement in June 2024.  He has been working losing weight, doing more exercises and walking, and eating healthier.  He remains compliant with compression stockings.  No issues at his job. Most recent HbA1c with PCP was 6.2%, stable per pt.  --------- HPI on 1/02/24: "He presents today to discuss recent blood work results from 11/2023. Results were notable for positive JORDAN, Sm as well as high kappa lambda ratio and free kappa light chains. Referrals for rheumatology and hematology, patient has yet to make appointments and see the specialist. His TSH was also high, so he saw his PCP for this, started on levothyroxine and will have thyroid levels rechecked in 2 months. He previously trialed the gabapentin for his neuropathy, but had intolerable side effects including dizziness and lightheadedness in the first couple of days at 100 mg TID, so he self discontinued this. He has not gone to physical therapy yet. He remains with the same symptoms, dizziness, unsteady gait, numbness and tingling of bilateral lower extremities. He still driving as a , his symptoms do not interfere with his job, no MVAs. No recent falls. He plans to get his right knee replaced sometime this summer. He recently obtained CAT scan of the neck to evaluate possible mass in the neck strap musculature, however the report is not back yet. His blood pressure and sugars have been stable at home."  HPI on 10/31/23: "Today, he remains with dizziness and unsteady gait but states these have been better since he started wearing compression stockings. He was told by his wife that he is not as off balance compared to before. He has not had any falls since his last office visit. His BLE paresthesia remains the same, described as numbness and tingling. He denies any burning pain or nighttime disturbances. He denies new headache, vision changes, double vision, blurry vision, difficulty with speech or swallowing, new focal weakness, BUE numbness and tingling. Per pt his BP and sugars have been stable at home. EMG/NCS of BLE was done on 8/25/23, demonstrating sensorimotor peripheral neuropathy. MRA head/neck 10/14/23 shows possible mass in neck strap musculature, but without LVO or hemodynamically significant stenoses."  HPI on 8/16/23: "Presents to office today to discuss recent imaging results. MRI Brain unremarkable. No recent vessel imaging. Discussed recent spine imaging, notable for multiple herniated discs. Patient states R leg has been weak, plan is for right total knee arthroplasty with ortho Dr. Kalpesh Mast. Has been wearing thigh high compression stockings, wearing for 2 weeks, feeling better with them. Currently working as a . No plans of group home soon."  Initial HPI on 7/05/23: "68 YO M was referred for frequent falls. Last fall was last night, pt reports swaying always to the right. Pt reports weakness in his right leg for the past year. He underwent right knee surgery and supposed to have it reevaluated in a few weeks. Pt reports bilateral leg swelling for many months, and uses diabetic socks. He has had bilateral feet numbness for the past few years.He has Afib s/p oblation and cardioversion, which did not work. He is on Apixaban 5mg BID. Denies any other weakness, numbness, visual changes, speech or language abnormalities."

## 2024-04-02 NOTE — REVIEW OF SYSTEMS
[Facial Weakness] : no facial weakness [Arm Weakness] : no arm weakness [Hand Weakness] : no hand weakness [Numbness] : numbness [Leg Weakness] : leg weakness [Tingling] : tingling [Abnormal Sensation] : an abnormal sensation [Cluster Headache] : no cluster headache [Migraine Headache] : no migraine headache [Tension Headache] : no tension-type headache [Inability to Walk] : able to walk [Difficulty Walking] : difficulty walking [Eyesight Problems] : no eyesight problems [Frequent Falls] : not falling [FreeTextEntry2] :  10 systems reviewed, as documented in HPI or negative.

## 2024-04-02 NOTE — PHYSICAL EXAM
[FreeTextEntry1] : NEURO: Mental Status Oriented to person, place, time, and situation Speech is clear, fluent, and spontaneous. Comprehension and memory intact   Cranial Nerves Visual fields full to confrontation Pupils equal, round, reactive to light. Extraocular movements intact. No nystagmus or ptosis. Facial sensation intact and symmetric in V1, V2, V3 Facial movement intact and symmetric Uvula midline, soft palate elevates normally Bilateral shoulder shrug intact Tongue midline, no tongue bite sign or deviation on protrusion   Motor Tone and bulk intact Shoulder abduction: 5/5 b/l Elbow flexion/extension: 5/5 bl Hand : 5/5 b/l Hip flexion/extension: 4-/5 R 5/5 L Knee flexion/extension: 4/5 R 5/5 L Dorsiflexion/plantar flexion: 4/5 R 5/5 L No pronator drift   Sensation Light touch diminished below knee bilaterally    Deep Tendon Reflexes Biceps 2+ b/l Brachioradialis 2+ b/l Patellar 1+ b/l Ankle 1+ b/l   Gait WNL Negative Romberg.     GEN: awake, alert, interactive, no acute distress EYES: sclera white, conjunctiva clear, no redness or discharge ENT: normal appearing outer ears, hearing grossly intact PULM: normal respiratory rhythm and effort EXT: moving all extremities spontaneously, no edema, no cyanosis SKIN: warm, dry, no lesions on visualized skin

## 2024-05-23 ENCOUNTER — NON-APPOINTMENT (OUTPATIENT)
Age: 69
End: 2024-05-23

## 2024-06-03 ENCOUNTER — APPOINTMENT (OUTPATIENT)
Dept: NEUROLOGY | Facility: CLINIC | Age: 69
End: 2024-06-03
Payer: MEDICARE

## 2024-06-03 VITALS
WEIGHT: 210 LBS | HEART RATE: 77 BPM | HEIGHT: 67 IN | DIASTOLIC BLOOD PRESSURE: 72 MMHG | BODY MASS INDEX: 32.96 KG/M2 | SYSTOLIC BLOOD PRESSURE: 114 MMHG | OXYGEN SATURATION: 95 %

## 2024-06-03 DIAGNOSIS — R26.81 UNSTEADINESS ON FEET: ICD-10-CM

## 2024-06-03 DIAGNOSIS — Z01.818 ENCOUNTER FOR OTHER PREPROCEDURAL EXAMINATION: ICD-10-CM

## 2024-06-03 DIAGNOSIS — E11.42 TYPE 2 DIABETES MELLITUS WITH DIABETIC POLYNEUROPATHY: ICD-10-CM

## 2024-06-03 PROCEDURE — 99215 OFFICE O/P EST HI 40 MIN: CPT

## 2024-06-03 NOTE — PHYSICAL EXAM
[FreeTextEntry1] : NEURO: Mental Status Oriented to person, place, time, and situation Speech is clear, fluent, and spontaneous. Comprehension and memory intact.   Cranial Nerves Visual fields full to confrontation Pupils equal, round, reactive to light. Extraocular movements intact. No nystagmus or ptosis. Facial sensation intact and symmetric in V1, V2, V3 Facial movement intact and symmetric Uvula midline, soft palate elevates normally Bilateral shoulder shrug intact Tongue midline, no tongue bite sign or deviation on protrusion   Motor Tone and bulk intact Shoulder abduction: 5/5 b/l Elbow flexion/extension: 5/5 bl Hand : 5/5 b/l Hip flexion/extension: 4-/5 R 5/5 L Knee flexion/extension: 4/5 R 5/5 L Dorsiflexion/plantar flexion: 4/5 R 5/5 L No pronator drift  Sensation Light touch diminished below knee bilaterally  Gait WNL Negative Romberg.   GEN: awake, alert, interactive, no acute distress EYES: sclera white, conjunctiva clear, no redness or discharge ENT: normal appearing outer ears, hearing grossly intact PULM: normal respiratory rhythm and effort EXT: moving all extremities spontaneously, no edema, no cyanosis SKIN: warm, dry, no lesions on visualized skin.

## 2024-06-03 NOTE — HISTORY OF PRESENT ILLNESS
[FreeTextEntry1] : Carter Israel is a 68 year old male with medical history significant for T2DM, HTN, pAF on Eliquis, presenting for follow up. He was previously seen by Dr. Felix on 8/16/23 for dizziness, gait instability, and frequent falls.  He is here today for neurology clearance for right knee replacement, scheduled for 6/26/24 with Dr. Mast. He is doing leg exercises with therapist and at home. He feels his strength is getting a little better. No new symptoms including headache, vision changes, difficulty with speaking or swallowing, new paresthesia, new focal weakness, falls. He walks 1/8 to 1/4 mile daily. He is compliant with his compression stockings. 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 He is trying to keep hydrated however it is difficult in the day when he is driving and cannot access a bathroom readily.  -------- HPI on 4/02/24: Since his last visit, he has seen hematology, their workup ruled out lymphoma and leukemia. They referred him to nephrology because they believe there is possibly decreased renal clearance of light chains. He is following up with them in 6 months. He was also seen by rheumatology for an initial visit, he is seeing them again in a few months for further blood work. He has been going to physical therapy for the past 1.5 months, he said that the exercises helped. He has been doing them at home. He does states that the treatments does not last long. He has been going 3 times per week. They recommended that he started an OTC B complex vitamin, he noticed a big difference in his energy level. He fell at home 2 months ago, no head strike. States that his left side still feels sore, but the swelling has resolved. He started on thyroid medication with his PCP, had blood work recently which showed that the TSH normalized. The mass on his neck was evaluated via CT neck, was told by his PCP that it looks normal. He is following with orthopedic Dr. Mast, scheduled for a right knee replacement in June 2024. He has been working losing weight, doing more exercises and walking, and eating healthier. He remains compliant with compression stockings. No issues at his job. Most recent HbA1c with PCP was 6.2%, stable per pt.  HPI on 1/02/24: "He presents today to discuss recent blood work results from 11/2023. Results were notable for positive JORDAN, Sm as well as high kappa lambda ratio and free kappa light chains. Referrals for rheumatology and hematology, patient has yet to make appointments and see the specialist. His TSH was also high, so he saw his PCP for this, started on levothyroxine and will have thyroid levels rechecked in 2 months. He previously trialed the gabapentin for his neuropathy, but had intolerable side effects including dizziness and lightheadedness in the first couple of days at 100 mg TID, so he self discontinued this. He has not gone to physical therapy yet. He remains with the same symptoms, dizziness, unsteady gait, numbness and tingling of bilateral lower extremities. He still driving as a , his symptoms do not interfere with his job, no MVAs. No recent falls. He plans to get his right knee replaced sometime this summer. He recently obtained CAT scan of the neck to evaluate possible mass in the neck strap musculature, however the report is not back yet. His blood pressure and sugars have been stable at home."  HPI on 10/31/23: "Today, he remains with dizziness and unsteady gait but states these have been better since he started wearing compression stockings. He was told by his wife that he is not as off balance compared to before. He has not had any falls since his last office visit. His BLE paresthesia remains the same, described as numbness and tingling. He denies any burning pain or nighttime disturbances. He denies new headache, vision changes, double vision, blurry vision, difficulty with speech or swallowing, new focal weakness, BUE numbness and tingling. Per pt his BP and sugars have been stable at home. EMG/NCS of BLE was done on 8/25/23, demonstrating sensorimotor peripheral neuropathy. MRA head/neck 10/14/23 shows possible mass in neck strap musculature, but without LVO or hemodynamically significant stenoses."  HPI on 8/16/23: "Presents to office today to discuss recent imaging results. MRI Brain unremarkable. No recent vessel imaging. Discussed recent spine imaging, notable for multiple herniated discs. Patient states R leg has been weak, plan is for right total knee arthroplasty with ortho Dr. Kalpesh Mast. Has been wearing thigh high compression stockings, wearing for 2 weeks, feeling better with them. Currently working as a . No plans of halfway soon."  Initial HPI on 7/05/23: "66 YO M was referred for frequent falls. Last fall was last night, pt reports swaying always to the right. Pt reports weakness in his right leg for the past year. He underwent right knee surgery and supposed to have it reevaluated in a few weeks. Pt reports bilateral leg swelling for many months, and uses diabetic socks. He has had bilateral feet numbness for the past few years.He has Afib s/p oblation and cardioversion, which did not work. He is on Apixaban 5mg BID. Denies any other weakness, numbness, visual changes, speech or language abnormalities."

## 2024-06-03 NOTE — ASSESSMENT
[FreeTextEntry1] : - 67 y/o M with PMHx T2DM, HTN, pAF on Eliquis here for follow up.  EMG/NCS shows BLE sensorimotor peripheral neuropathy MRA H/N negative for LVO or significant stenoses. Possible mass in anterior strap musculature. 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   At this time from neurological standpoint, there is no absolute contraindication to planned procedure - R knee replacement 6/26/24.  Recommendations: - continue physical therapy, referral placed for BLE weakness, gait and stability - ok to continue OTC Vitamin B complex - recheck vitamin B1, B6, B12, folate at next visit - continue compression stockings stroke risk factor modifications discussed in detail - compliance with AC iso AF - BP control - heart rate and rhythm control - glucose and cholesterol control - healthy lifestyle and hydration, diet, physical activity as tolerated - incorporate walks at work - pt works as , advised him to avoid prolonged positioning  Patient to return to office in 4 months, or sooner if needed. Patient understands to seek urgent medical evaluation for any new or worsening symptoms.

## 2024-06-11 ENCOUNTER — NON-APPOINTMENT (OUTPATIENT)
Age: 69
End: 2024-06-11

## 2024-07-06 ENCOUNTER — NON-APPOINTMENT (OUTPATIENT)
Age: 69
End: 2024-07-06

## 2024-07-12 ENCOUNTER — APPOINTMENT (OUTPATIENT)
Dept: ORTHOPEDIC SURGERY | Facility: CLINIC | Age: 69
End: 2024-07-12

## 2024-07-17 ENCOUNTER — APPOINTMENT (OUTPATIENT)
Dept: ORTHOPEDIC SURGERY | Facility: HOSPITAL | Age: 69
End: 2024-07-17

## 2024-07-17 ENCOUNTER — RESULT REVIEW (OUTPATIENT)
Age: 69
End: 2024-07-17

## 2024-07-17 PROCEDURE — 27447 TOTAL KNEE ARTHROPLASTY: CPT | Mod: RT

## 2024-07-17 PROCEDURE — 20985 CPTR-ASST DIR MS PX: CPT

## 2024-07-26 RX ORDER — CEPHALEXIN 500 MG/1
500 CAPSULE ORAL 4 TIMES DAILY
Qty: 28 | Refills: 0 | Status: ACTIVE | COMMUNITY
Start: 2024-07-26 | End: 1900-01-01

## 2024-07-30 ENCOUNTER — APPOINTMENT (OUTPATIENT)
Dept: ORTHOPEDIC SURGERY | Facility: CLINIC | Age: 69
End: 2024-07-30
Payer: MEDICARE

## 2024-07-30 DIAGNOSIS — Z96.651 PRESENCE OF RIGHT ARTIFICIAL KNEE JOINT: ICD-10-CM

## 2024-07-30 PROCEDURE — 99024 POSTOP FOLLOW-UP VISIT: CPT

## 2024-07-30 NOTE — REASON FOR VISIT
[FreeTextEntry2] : here for f/u right knee TKA 7/17/24.  using cane for ambulation and wearing stockings.  will start OPPT next week.  using eliquis for anticoag. using oxycodone 2/day and tylenol.

## 2024-07-30 NOTE — DISCUSSION/SUMMARY
[de-identified] : Patient is progressing well at this time. Upon inspection of the incision, the area was clean, dry and there were no signs of infection.   Patient has been involved in home PT at this time, was provided with Rx today to begin OPPT. Discussion was had with the patient regarding the importance of working on ROM at this time both with PT and at home using HEP shown today. Discussed the importance of gaining and maintaining good ROM and its involvement in daily life.     Patient will f/u in 4-6 weeks for XR

## 2024-07-30 NOTE — PHYSICAL EXAM
[Right] : right knee [5___] : hamstring 5[unfilled]/5 [] : ambulation with cane [FreeTextEntry3] : Omid Removed C/D/I [TWNoteComboBox7] : flexion 80 degrees [de-identified] : extension 0 degrees

## 2024-08-21 ENCOUNTER — NON-APPOINTMENT (OUTPATIENT)
Age: 69
End: 2024-08-21

## 2024-08-21 RX ORDER — OXYCODONE 5 MG/1
5 TABLET ORAL
Qty: 30 | Refills: 0 | Status: ACTIVE | COMMUNITY
Start: 2024-08-21 | End: 1900-01-01

## 2024-08-27 ENCOUNTER — APPOINTMENT (OUTPATIENT)
Dept: ORTHOPEDIC SURGERY | Facility: CLINIC | Age: 69
End: 2024-08-27
Payer: MEDICARE

## 2024-08-27 DIAGNOSIS — M17.11 UNILATERAL PRIMARY OSTEOARTHRITIS, RIGHT KNEE: ICD-10-CM

## 2024-08-27 PROCEDURE — 73562 X-RAY EXAM OF KNEE 3: CPT | Mod: RT

## 2024-08-27 PROCEDURE — 99024 POSTOP FOLLOW-UP VISIT: CPT

## 2024-08-27 NOTE — DISCUSSION/SUMMARY
[de-identified] : Discussed importance of non-impact exercise and muscle stretching before and after exercise. Reviewed x-rays Explained the importance of ice and rest.  Stretching exercises shown, Explained the importance of Range of Motion before strength.   At this time after discussion of the options, the patient would benefit from CONTINUED organized Physical Therapy to continue to increase overall functionality. The patient was provided with an updated Rx in office today and was instructed to attend PT for 6-8 weeks in order to increase strength and ROM. Patient agreed with this plan and will continue PT at this time.  Continue home strengthening and stretching program consisting of non-impact exercises and ice as needed. Return to office 6 weeks

## 2024-08-27 NOTE — PHYSICAL EXAM
[Right] : right knee [5___] : hamstring 5[unfilled]/5 [] : decreased sensation lateral to incision [FreeTextEntry3] : Omid Removed C/D/I [TWNoteComboBox7] : flexion 125 degrees [de-identified] : extension 0 degrees

## 2024-10-01 ENCOUNTER — NON-APPOINTMENT (OUTPATIENT)
Age: 69
End: 2024-10-01

## 2024-10-08 ENCOUNTER — APPOINTMENT (OUTPATIENT)
Dept: ORTHOPEDIC SURGERY | Facility: CLINIC | Age: 69
End: 2024-10-08
Payer: MEDICARE

## 2024-10-08 VITALS — HEIGHT: 67 IN | BODY MASS INDEX: 32.02 KG/M2 | WEIGHT: 204 LBS

## 2024-10-08 DIAGNOSIS — Z96.651 PRESENCE OF RIGHT ARTIFICIAL KNEE JOINT: ICD-10-CM

## 2024-10-08 PROCEDURE — 73562 X-RAY EXAM OF KNEE 3: CPT | Mod: RT

## 2024-10-08 PROCEDURE — 99024 POSTOP FOLLOW-UP VISIT: CPT

## 2024-10-29 ENCOUNTER — APPOINTMENT (OUTPATIENT)
Dept: NEUROLOGY | Facility: CLINIC | Age: 69
End: 2024-10-29
Payer: MEDICARE

## 2024-10-29 VITALS
HEART RATE: 77 BPM | SYSTOLIC BLOOD PRESSURE: 142 MMHG | HEIGHT: 67 IN | BODY MASS INDEX: 32.02 KG/M2 | WEIGHT: 204 LBS | OXYGEN SATURATION: 97 % | DIASTOLIC BLOOD PRESSURE: 78 MMHG

## 2024-10-29 DIAGNOSIS — E11.42 TYPE 2 DIABETES MELLITUS WITH DIABETIC POLYNEUROPATHY: ICD-10-CM

## 2024-10-29 PROCEDURE — 99214 OFFICE O/P EST MOD 30 MIN: CPT

## 2025-05-22 ENCOUNTER — NON-APPOINTMENT (OUTPATIENT)
Age: 70
End: 2025-05-22

## 2025-07-03 ENCOUNTER — NON-APPOINTMENT (OUTPATIENT)
Age: 70
End: 2025-07-03

## 2025-07-22 ENCOUNTER — APPOINTMENT (OUTPATIENT)
Dept: ORTHOPEDIC SURGERY | Facility: CLINIC | Age: 70
End: 2025-07-22
Payer: MEDICARE

## 2025-07-22 VITALS — HEIGHT: 67 IN | WEIGHT: 204 LBS | BODY MASS INDEX: 32.02 KG/M2

## 2025-07-22 DIAGNOSIS — Z96.651 PRESENCE OF RIGHT ARTIFICIAL KNEE JOINT: ICD-10-CM

## 2025-07-22 DIAGNOSIS — M17.11 UNILATERAL PRIMARY OSTEOARTHRITIS, RIGHT KNEE: ICD-10-CM

## 2025-07-22 PROCEDURE — 73562 X-RAY EXAM OF KNEE 3: CPT | Mod: RT

## 2025-07-22 PROCEDURE — 99213 OFFICE O/P EST LOW 20 MIN: CPT
